# Patient Record
Sex: FEMALE | Race: WHITE | Employment: UNEMPLOYED | URBAN - METROPOLITAN AREA
[De-identification: names, ages, dates, MRNs, and addresses within clinical notes are randomized per-mention and may not be internally consistent; named-entity substitution may affect disease eponyms.]

---

## 2020-08-15 ENCOUNTER — HOSPITAL ENCOUNTER (EMERGENCY)
Age: 20
Discharge: HOME | End: 2020-08-15
Attending: EMERGENCY MEDICINE
Payer: MEDICAID

## 2020-08-15 ENCOUNTER — APPOINTMENT (OUTPATIENT)
Dept: GENERAL RADIOLOGY | Age: 20
End: 2020-08-15
Payer: MEDICAID

## 2020-08-15 VITALS
OXYGEN SATURATION: 98 % | RESPIRATION RATE: 14 BRPM | DIASTOLIC BLOOD PRESSURE: 87 MMHG | WEIGHT: 115 LBS | SYSTOLIC BLOOD PRESSURE: 143 MMHG | HEART RATE: 104 BPM | TEMPERATURE: 98.4 F

## 2020-08-15 PROCEDURE — 70110 X-RAY EXAM OF JAW 4/> VIEWS: CPT

## 2020-08-15 PROCEDURE — 99283 EMERGENCY DEPT VISIT LOW MDM: CPT

## 2020-08-15 PROCEDURE — 6370000000 HC RX 637 (ALT 250 FOR IP): Performed by: GENERAL PRACTICE

## 2020-08-15 RX ADMIN — MUPIROCIN: 20 OINTMENT TOPICAL at 13:05

## 2020-08-15 ASSESSMENT — ENCOUNTER SYMPTOMS
SHORTNESS OF BREATH: 0
FACIAL SWELLING: 0
VOMITING: 0
VOICE CHANGE: 0
NAUSEA: 0
COUGH: 0
SORE THROAT: 0
COLOR CHANGE: 1
TROUBLE SWALLOWING: 0

## 2020-08-15 ASSESSMENT — PAIN DESCRIPTION - PAIN TYPE: TYPE: ACUTE PAIN

## 2020-08-15 NOTE — ED NOTES
Patient into ER with c/o facial pain/jaw pain  Denies injuries  Reports that she has been tugging and pulling on her chin the past several days and now having increased pain and discomfort  Patient states that she is now unable to open her mouth fully   Patient speaking in full sentences without issues     Ambulatory to room with significant other, steady gait    Nondistressed  GCS=15  VS stable    Call light within reach  Updated on plan of care and processes  Denies complaints at this time  Will continue to monitor       Trae Rivas RN  08/15/20 9021

## 2020-08-15 NOTE — ED PROVIDER NOTES
[facility]  ED  Emergency Department Encounter  EmergencyMedicine Resident     Pt Name:Elizabeth Dunham  MRN: [id number]  [place] 2000  Date of evaluation: 8/15/20  PCP:  No primary care provider on file. CHIEF COMPLAINT       Chief Complaint   Patient presents with    Facial Pain     reports s/s started yesterday, reports that she has been pulling and tuggin her chin now having increased pain       HISTORY OF PRESENT ILLNESS  (Location/Symptom, Timing/Onset, Context/Setting, Quality, Duration, Modifying Factors, Severity.)      Lindsey Fong is a 23 y.o. female who presents with chin/jaw pain. Patient states that the symptoms started yesterday. Patient states that she has anxiety, and her consoling mechanism is to pull at her chin. Patient denies any trauma. Patient states she feels like she cannot open her mouth all the way secondary to pain, when asked where the pain is she points directly to her chin. Patient is a poor historian. Denies any fever, chills, nausea, vomiting, sore throat, difficulty swallowing or change in voice. Patient states she has used Motrin several times since yesterday. Patient states that she does have pain when she turns to the right. PAST MEDICAL / SURGICAL / SOCIAL / FAMILY HISTORY      has no past medical history on file. Asthma     has no past surgical history on file.     Social History     Socioeconomic History    Marital status: Not on file     Spouse name: Not on file    Number of children: Not on file    Years of education: Not on file    Highest education level: Not on file   Occupational History    Not on file   Social Needs    Financial resource strain: Not on file    Food insecurity     Worry: Not on file     Inability: Not on file    Transportation needs     Medical: Not on file     Non-medical: Not on file   Tobacco Use    Smoking status: Never Smoker    Smokeless tobacco: Never Used   Substance and Sexual Activity    Alcohol use: Not on file    Drug use: Not on file    Sexual activity: Not on file   Lifestyle    Physical activity     Days per week: Not on file     Minutes per session: Not on file    Stress: Not on file   Relationships    Social connections     Talks on phone: Not on file     Gets together: Not on file     Attends Yazidism service: Not on file     Active member of club or organization: Not on file     Attends meetings of clubs or organizations: Not on file     Relationship status: Not on file    Intimate partner violence     Fear of current or ex partner: Not on file     Emotionally abused: Not on file     Physically abused: Not on file     Forced sexual activity: Not on file   Other Topics Concern    Not on file   Social History Narrative    Not on file       History reviewed. No pertinent family history. Allergies:  Patient has no known allergies. Home Medications:  Prior to Admission medications    Not on File       REVIEW OF SYSTEMS    (2-9 systems for level 4, 10 or more for level 5)      Review of Systems   Constitutional: Negative for activity change, appetite change, chills, fatigue and fever. HENT: Negative for ear pain, facial swelling, hearing loss, sore throat, trouble swallowing and voice change. Respiratory: Negative for cough and shortness of breath. Cardiovascular: Negative for chest pain. Gastrointestinal: Negative for nausea and vomiting. Musculoskeletal: Negative for neck pain. Skin: Positive for color change. Negative for wound. Neurological: Negative for headaches. Psychiatric/Behavioral: Negative for agitation, behavioral problems, hallucinations, self-injury and suicidal ideas. PHYSICAL EXAM   (up to 7 for level 4, 8 or more for level 5)      INITIAL VITALS:   BP (!) 143/87   Pulse 104   Temp 98.4 °F (36.9 °C) (Oral)   Resp 14   Wt 115 lb (52.2 kg)   LMP 08/15/2020   SpO2 98%     Physical Exam  Constitutional:       General: She is not in acute distress. Appearance: Normal appearance. She is not ill-appearing, toxic-appearing or diaphoretic. HENT:      Head: Normocephalic and atraumatic. Comments: No swelling noted on the face, no loose teeth or dental caries or abscess noted. No malocclusion. Patient is able to bite down on popsicle stick, is not able to break it secondary to pain. Patient does complain of pain to palpation along the left TMJ, no popping or clicking noted with palpation over the temporomandibular joint with range of motion. Mouth/Throat:      Pharynx: No oropharyngeal exudate or posterior oropharyngeal erythema. Neck:      Musculoskeletal: Normal range of motion. Pulmonary:      Comments: Breathing comfortably room air, symmetrical chest rise, speaking full sentences. Musculoskeletal:         General: No swelling. Skin:     General: Skin is warm. Comments: There is area of superficial redness over her chin where patient states she has been pinching/pulling at   Neurological:      Mental Status: She is alert and oriented to person, place, and time. Gait: Gait normal.         DIFFERENTIAL  DIAGNOSIS     PLAN (LABS / IMAGING / EKG):  Orders Placed This Encounter   Procedures    XR MANDIBLE (MIN 4 VIEWS)       MEDICATIONS ORDERED:  Orders Placed This Encounter   Medications    mupirocin (BACTROBAN) 2 % ointment       DDX: TMJ disorder, cancer, irritation from chronic pulling at chin, conversion disorder, tetanus, drug toxicity, dystonic reaction, torticollis    DIAGNOSTIC RESULTS / [address] [place] [address] / [facility]   :  No results found for this visit on 08/15/20. RADIOLOGY:  Narrative & Impression      EXAMINATION:  FOUR XRAY VIEWS OF THE MANDIBLE     8/15/2020 1:06 pm     COMPARISON:  None.     HISTORY:  ORDERING SYSTEM PROVIDED HISTORY: pain in jaw  TECHNOLOGIST PROVIDED HISTORY:  pain in jaw  Reason for Exam: pain in jaw  Acuity: Unknown     FINDINGS:  No acute fracture.   No osseous abnormality.     IMPRESSION:  No radiographic abnormality. EKG  None    All EKG's are interpreted by the Emergency Department Physician who either signs or Co-signs this chart in the absence of a cardiologist.    EMERGENCY DEPARTMENT COURSE/IMPRESSION: 27-year-old female, poor historian presenting with vague jaw pain, patient is a poor historian, physical exam is unremarkable. Patient is afebrile, low concern for tetanus as patient denies any recent injury. Patient is able to open jaw, no trismus noted. We will plan to obtain mandibular plain films, area of chin where patient continues to repetitively irritate and pull at as redness, possible cellulitis. Will give patient Bactroban instructed patient to stop messing with this area, educate patient she should follow-up with her primary care provider and dentist for possible bite block and further evaluation. Additional return precautions were discussed. PROCEDURES:  None    CONSULTS:  None    CRITICAL CARE:  None    FINAL IMPRESSION      1. Jaw pain          DISPOSITION / PLAN     DISPOSITION        PATIENT REFERRED TO:  No follow-up provider specified.     DISCHARGE MEDICATIONS:  New Prescriptions    No medications on file       Cirilo Sheriff DO  Emergency Medicine Resident    (Please note that portions of thisnote were completed with a voice recognition program.  Efforts were made to edit the dictations but occasionally words are mis-transcribed.)     Cirilo Sheriff DO  Resident  08/15/20 0605